# Patient Record
Sex: FEMALE | Race: WHITE | NOT HISPANIC OR LATINO | Employment: UNEMPLOYED | URBAN - METROPOLITAN AREA
[De-identification: names, ages, dates, MRNs, and addresses within clinical notes are randomized per-mention and may not be internally consistent; named-entity substitution may affect disease eponyms.]

---

## 2018-03-06 ENCOUNTER — OFFICE VISIT CONVERTED (OUTPATIENT)
Dept: ORTHOPEDIC SURGERY | Facility: CLINIC | Age: 58
End: 2018-03-06
Attending: ORTHOPAEDIC SURGERY

## 2018-03-27 ENCOUNTER — OFFICE VISIT CONVERTED (OUTPATIENT)
Dept: ORTHOPEDIC SURGERY | Facility: CLINIC | Age: 58
End: 2018-03-27
Attending: PHYSICIAN ASSISTANT

## 2018-05-01 ENCOUNTER — CONVERSION ENCOUNTER (OUTPATIENT)
Dept: ORTHOPEDIC SURGERY | Facility: CLINIC | Age: 58
End: 2018-05-01

## 2018-05-01 ENCOUNTER — OFFICE VISIT CONVERTED (OUTPATIENT)
Dept: ORTHOPEDIC SURGERY | Facility: CLINIC | Age: 58
End: 2018-05-01
Attending: PHYSICIAN ASSISTANT

## 2018-05-08 ENCOUNTER — OFFICE VISIT CONVERTED (OUTPATIENT)
Dept: ORTHOPEDIC SURGERY | Facility: CLINIC | Age: 58
End: 2018-05-08
Attending: PHYSICIAN ASSISTANT

## 2018-05-08 ENCOUNTER — CONVERSION ENCOUNTER (OUTPATIENT)
Dept: ORTHOPEDIC SURGERY | Facility: CLINIC | Age: 58
End: 2018-05-08

## 2018-11-13 ENCOUNTER — OFFICE VISIT CONVERTED (OUTPATIENT)
Dept: ORTHOPEDIC SURGERY | Facility: CLINIC | Age: 58
End: 2018-11-13
Attending: PHYSICIAN ASSISTANT

## 2018-11-20 ENCOUNTER — OFFICE VISIT CONVERTED (OUTPATIENT)
Dept: ORTHOPEDIC SURGERY | Facility: CLINIC | Age: 58
End: 2018-11-20
Attending: PHYSICIAN ASSISTANT

## 2019-09-19 ENCOUNTER — HOSPITAL ENCOUNTER (OUTPATIENT)
Dept: MAMMOGRAPHY | Facility: HOSPITAL | Age: 59
Discharge: HOME | End: 2019-09-19
Attending: NURSE PRACTITIONER

## 2021-05-16 VITALS — HEART RATE: 68 BPM | BODY MASS INDEX: 48.03 KG/M2 | OXYGEN SATURATION: 98 % | WEIGHT: 261 LBS | HEIGHT: 62 IN

## 2021-05-16 VITALS — HEIGHT: 64 IN | OXYGEN SATURATION: 96 % | BODY MASS INDEX: 45.24 KG/M2 | WEIGHT: 265 LBS | HEART RATE: 89 BPM

## 2021-05-16 VITALS — HEART RATE: 57 BPM | BODY MASS INDEX: 48.97 KG/M2 | WEIGHT: 266.12 LBS | OXYGEN SATURATION: 97 % | HEIGHT: 62 IN

## 2021-05-16 VITALS — BODY MASS INDEX: 48.12 KG/M2 | HEART RATE: 51 BPM | HEIGHT: 62 IN | OXYGEN SATURATION: 97 % | WEIGHT: 261.5 LBS

## 2021-05-16 VITALS — OXYGEN SATURATION: 98 % | BODY MASS INDEX: 48.63 KG/M2 | HEIGHT: 62 IN | WEIGHT: 264.25 LBS | HEART RATE: 66 BPM

## 2021-05-16 VITALS — WEIGHT: 263.12 LBS | HEART RATE: 94 BPM | BODY MASS INDEX: 48.42 KG/M2 | HEIGHT: 62 IN | OXYGEN SATURATION: 99 %

## 2021-08-12 ENCOUNTER — TRANSCRIBE ORDERS (OUTPATIENT)
Dept: ADMINISTRATIVE | Facility: HOSPITAL | Age: 61
End: 2021-08-12

## 2021-08-12 DIAGNOSIS — Z12.31 SCREENING MAMMOGRAM FOR HIGH-RISK PATIENT: Primary | ICD-10-CM

## 2022-06-21 ENCOUNTER — APPOINTMENT (OUTPATIENT)
Dept: GENERAL RADIOLOGY | Facility: HOSPITAL | Age: 62
End: 2022-06-21

## 2022-06-21 ENCOUNTER — HOSPITAL ENCOUNTER (EMERGENCY)
Facility: HOSPITAL | Age: 62
Discharge: HOME | End: 2022-06-21
Attending: EMERGENCY MEDICINE | Admitting: EMERGENCY MEDICINE

## 2022-06-21 VITALS
DIASTOLIC BLOOD PRESSURE: 91 MMHG | BODY MASS INDEX: 44.9 KG/M2 | SYSTOLIC BLOOD PRESSURE: 165 MMHG | RESPIRATION RATE: 18 BRPM | OXYGEN SATURATION: 100 % | WEIGHT: 263 LBS | TEMPERATURE: 97.9 F | HEART RATE: 50 BPM | HEIGHT: 64 IN

## 2022-06-21 DIAGNOSIS — M54.50 ACUTE BILATERAL LOW BACK PAIN WITHOUT SCIATICA: ICD-10-CM
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
## 2022-06-21 DIAGNOSIS — M54.2 NECK PAIN: ICD-10-CM

## 2022-06-21 PROCEDURE — 99282 EMERGENCY DEPT VISIT SF MDM: CPT

## 2022-06-21 PROCEDURE — 72050 X-RAY EXAM NECK SPINE 4/5VWS: CPT

## 2022-06-21 PROCEDURE — 96372 THER/PROPH/DIAG INJ SC/IM: CPT

## 2022-06-21 PROCEDURE — 25010000002 KETOROLAC TROMETHAMINE PER 15 MG: Performed by: REGISTERED NURSE

## 2022-06-21 PROCEDURE — 25010000002 ORPHENADRINE CITRATE PER 60 MG: Performed by: REGISTERED NURSE

## 2022-06-21 PROCEDURE — 72100 X-RAY EXAM L-S SPINE 2/3 VWS: CPT

## 2022-06-21 RX ORDER — LOSARTAN POTASSIUM 25 MG/1
TABLET ORAL
COMMUNITY

## 2022-06-21 RX ORDER — CYCLOBENZAPRINE HCL 10 MG
10 TABLET ORAL 3 TIMES DAILY PRN
Qty: 20 TABLET | Refills: 0 | Status: SHIPPED | OUTPATIENT
Start: 2022-06-21

## 2022-06-21 RX ORDER — KETOROLAC TROMETHAMINE 10 MG/1
10 TABLET, FILM COATED ORAL EVERY 6 HOURS PRN
Qty: 20 TABLET | Refills: 0 | Status: SHIPPED | OUTPATIENT
Start: 2022-06-21 | End: 2022-06-26

## 2022-06-21 RX ORDER — METOPROLOL SUCCINATE 50 MG/1
TABLET, EXTENDED RELEASE ORAL
COMMUNITY

## 2022-06-21 RX ORDER — KETOROLAC TROMETHAMINE 30 MG/ML
30 INJECTION, SOLUTION INTRAMUSCULAR; INTRAVENOUS ONCE
Status: COMPLETED | OUTPATIENT
Start: 2022-06-21 | End: 2022-06-21

## 2022-06-21 RX ORDER — ORPHENADRINE CITRATE 30 MG/ML
60 INJECTION INTRAMUSCULAR; INTRAVENOUS ONCE
Status: COMPLETED | OUTPATIENT
Start: 2022-06-21 | End: 2022-06-21

## 2022-06-21 RX ADMIN — KETOROLAC TROMETHAMINE 30 MG: 30 INJECTION, SOLUTION INTRAMUSCULAR; INTRAVENOUS at 18:34

## 2022-06-21 RX ADMIN — ORPHENADRINE CITRATE 60 MG: 60 INJECTION INTRAMUSCULAR; INTRAVENOUS at 18:34
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
## 2022-06-22 NOTE — DISCHARGE INSTRUCTIONS
There were no acute findings on your x-rays today.  Take the prescribed medications as needed for pain.  You can add Tylenol for additional pain relief.  Do not take any additional NSAIDs while taking Toradol.  Apply ice to the areas that hurt several times per day.  Use warm moist compresses after 48 hours.    Return for worsening symptoms or new concerns.

## 2022-08-16 ENCOUNTER — TRANSCRIBE ORDERS (OUTPATIENT)
Dept: ADMINISTRATIVE | Facility: HOSPITAL | Age: 62
End: 2022-08-16

## 2022-08-16 DIAGNOSIS — Z12.31 SCREENING MAMMOGRAM FOR HIGH-RISK PATIENT: Primary | ICD-10-CM

## 2024-12-16 ENCOUNTER — OFFICE VISIT (OUTPATIENT)
Dept: CARDIOLOGY | Facility: CLINIC | Age: 64
End: 2024-12-16
Payer: MEDICAID

## 2024-12-16 VITALS
HEIGHT: 64 IN | DIASTOLIC BLOOD PRESSURE: 95 MMHG | BODY MASS INDEX: 40.87 KG/M2 | OXYGEN SATURATION: 97 % | SYSTOLIC BLOOD PRESSURE: 179 MMHG | HEART RATE: 86 BPM | WEIGHT: 239.4 LBS

## 2024-12-16 DIAGNOSIS — R00.2 PALPITATIONS: ICD-10-CM

## 2024-12-16 DIAGNOSIS — E78.2 MIXED HYPERLIPIDEMIA: ICD-10-CM

## 2024-12-16 DIAGNOSIS — R06.09 DYSPNEA ON EXERTION: ICD-10-CM

## 2024-12-16 DIAGNOSIS — I10 PRIMARY HYPERTENSION: Primary | ICD-10-CM

## 2024-12-16 PROCEDURE — 99204 OFFICE O/P NEW MOD 45 MIN: CPT | Performed by: INTERNAL MEDICINE

## 2024-12-16 RX ORDER — ATORVASTATIN CALCIUM 10 MG/1
1 TABLET, FILM COATED ORAL DAILY
COMMUNITY
Start: 2024-08-20

## 2024-12-16 RX ORDER — ATENOLOL AND CHLORTHALIDONE TABLET 100; 25 MG/1; MG/1
1 TABLET ORAL DAILY
COMMUNITY
Start: 2024-08-20

## 2024-12-16 RX ORDER — HYDROXYZINE HYDROCHLORIDE 25 MG/1
1 TABLET, FILM COATED ORAL EVERY 12 HOURS SCHEDULED
COMMUNITY
Start: 2024-08-20

## 2024-12-16 RX ORDER — DICLOFENAC SODIUM 75 MG/1
TABLET, DELAYED RELEASE ORAL EVERY 12 HOURS SCHEDULED
COMMUNITY

## 2024-12-16 RX ORDER — ALBUTEROL SULFATE 90 UG/1
AEROSOL, METERED RESPIRATORY (INHALATION)
COMMUNITY
Start: 2024-08-20

## 2024-12-16 NOTE — PROGRESS NOTES
"Chief Complaint  Establish Care and Hypertension    Subjective        Maria Antonia Amaya presents to [facility] CARDIOLOGY  History of present illness:    Patient is a 64-year-old female with history of hypertension.  She has been following with a primary care physician.  She is taking losartan 100 daily along with amlodipine 10 daily.  She has not been taking the atenolol/chlorthalidone 100/25 daily.  As she thought it had been discontinued.  She does note a occasional strange sensation in her chest that can occur just sitting still and last for a few seconds.  She does note dyspnea on exertion.  She states she is going from the car to the front door she will be short of breath.  She notes chronic mild edema that is \"kind of normal for her.\"  She does not smoke or drink alcohol.  Her mother and father have no heart disease.      Past Medical History:   Diagnosis Date    Diabetes mellitus     Hypertension          Past Surgical History:   Procedure Laterality Date    ADENOIDECTOMY      FINGER SURGERY      ORIF ANKLE FRACTURE      TONSILLECTOMY            Social History     Socioeconomic History    Marital status: Legally    Tobacco Use    Smoking status: Never     Passive exposure: Past   Vaping Use    Vaping status: Never Used   Substance and Sexual Activity    Alcohol use: Not Currently    Drug use: Not Currently    Sexual activity: Defer         Family History   Problem Relation Age of Onset    Dementia Mother              Kidney cancer Father                    No Known Allergies         Current Outpatient Medications:     AMLODIPINE BESYLATE PO, Take 10 mg by mouth Daily., Disp: , Rfl:     atenolol-chlorthalidone (TENORETIC) 100-25 MG per tablet, Take 1 tablet by mouth Daily., Disp: , Rfl:     atorvastatin (LIPITOR) 10 MG tablet, Take 1 tablet by mouth Daily., Disp: , Rfl:     Citalopram Hydrobromide (CELEXA PO), Take 40 mg by mouth Daily., Disp: , Rfl:     " "cyclobenzaprine (FLEXERIL) 10 MG tablet, Take 1 tablet by mouth 3 (Three) Times a Day As Needed for Muscle Spasms., Disp: 20 tablet, Rfl: 0    diclofenac (VOLTAREN) 75 MG EC tablet, Every 12 (Twelve) Hours., Disp: , Rfl:     hydrOXYzine (ATARAX) 25 MG tablet, Take 1 tablet by mouth Every 12 (Twelve) Hours., Disp: , Rfl:     losartan (COZAAR) 25 MG tablet, Take 4 tablets by mouth Daily., Disp: , Rfl:     Pantoprazole Sodium (PROTONIX PO), Take 40 mg by mouth Daily., Disp: , Rfl:     Semaglutide (RYBELSUS PO), Take  by mouth., Disp: , Rfl:     Ventolin  (90 Base) MCG/ACT inhaler, 2 PUFFS AS NEEDED INHALATION EVERY 4 HRS AS NEEDED 90 DAYS, Disp: , Rfl:     metFORMIN (GLUCOPHAGE) 500 MG tablet, metformin 500 mg oral tablet take 1 tablet (500 mg) by oral route 2 times per day with morning and evening meals   Active (Patient not taking: Reported on 12/16/2024), Disp: , Rfl:     metoprolol succinate XL (TOPROL-XL) 50 MG 24 hr tablet, metoprolol succinate 50 mg oral tablet extended release 24 hr take 1 tablet (50 mg) by oral route once daily   Suspended (Patient not taking: Reported on 12/16/2024), Disp: , Rfl:       ROS:  Cardiac review of systems positive for dyspnea on exertion, chronic mild edema    Objective     /95 (BP Location: Right arm, Patient Position: Sitting, Cuff Size: Large Adult)   Pulse 86   Ht 162.6 cm (64\")   Wt 109 kg (239 lb 6.4 oz)   SpO2 97%   BMI 41.09 kg/m²       General Appearance:   well developed  well nourished  HENT:   oropharynx moist  lips not cyanotic  Respiratory:  no respiratory distress  normal breath sounds  no rales  Cardiovascular:  no jugular venous distention  regular rhythm  S1 normal, S2 normal  no S3, no S4   no murmur  no rub, no thrill  No carotid bruit  pedal pulses normal  lower extremity edema: none    Musculoskeletal:  no clubbing of fingers.   normocephalic, head atraumatic  Skin:   warm, dry  Psychiatric:  judgement and insight appropriate  normal mood " "and affect    ECHO:    STRESS:    CATH:  No results found for this or any previous visit.    BMP:   No results found for: \"GLUCOSE\", \"BUN\", \"CREATININE\", \"NA\", \"K\", \"CL\", \"CO2\", \"CALCIUM\", \"BCR\", \"ANIONGAP\", \"EGFR\"  LIPIDS:  No results found for: \"CHOL\", \"TRIG\", \"HDL\", \"LDL\", \"VLDL\", \"LDLHDL\"      Procedures             ASSESSMENT:  Diagnoses and all orders for this visit:    1. Primary hypertension (Primary)    2. Mixed hyperlipidemia    3. Palpitations    4. Dyspnea on exertion         PLAN:    1.  For patient's dyspnea on exertion I did tell her that there are multiple different possible causes for this.  I did offer her a stress test.  She wants to go back on the atenolol/chlorthalidone and reassess it.  It is possible that with the very elevated blood pressure when she is walking around it is getting even higher and causing her symptoms.  If she continues to note dyspnea on exertion the only option from a heart standpoint would probably be a Lexiscan Cardiolite.  2.  Patient thought that they had stopped the atenolol/chlorthalidone.  I told her she needs to go back on it.  We will reassess her blood pressure at the next appointment.  If needed we would have to consider the addition of spironolactone as the next blood pressure pill.  3.  Patient notes mild chronic edema with no recent change.  We will see if the chlorthalidone helps with that.  4.  Patient has a weird sensation that I am not sure if she is describing palpitations.  We will also see if the addition of the atenolol resolves this.  It does occur just sitting still and last a few seconds.  5.  Patient cholesterol checked 1/9/2024 with LDL 82, HDL 53, and triglycerides 96.  These are under good control.  6.  Patient does not smoke      Return in about 4 weeks (around 1/13/2025).     Patient was given instructions and counseling regarding her condition or for health maintenance advice. Please see specific information pulled into the AVS if " appropriate.         Issac Mariee MD   12/16/2024  15:48 EST

## 2025-01-24 ENCOUNTER — OFFICE VISIT (OUTPATIENT)
Dept: CARDIOLOGY | Facility: CLINIC | Age: 65
End: 2025-01-24
Payer: COMMERCIAL

## 2025-01-24 VITALS
WEIGHT: 239 LBS | HEIGHT: 64 IN | SYSTOLIC BLOOD PRESSURE: 129 MMHG | HEART RATE: 51 BPM | BODY MASS INDEX: 40.8 KG/M2 | DIASTOLIC BLOOD PRESSURE: 71 MMHG

## 2025-01-24 DIAGNOSIS — R06.09 DYSPNEA ON EXERTION: ICD-10-CM

## 2025-01-24 DIAGNOSIS — I10 PRIMARY HYPERTENSION: Primary | ICD-10-CM

## 2025-01-24 DIAGNOSIS — E78.2 MIXED HYPERLIPIDEMIA: ICD-10-CM

## 2025-01-24 PROCEDURE — 99214 OFFICE O/P EST MOD 30 MIN: CPT | Performed by: INTERNAL MEDICINE

## 2025-01-24 RX ORDER — ONDANSETRON 4 MG/1
TABLET, ORALLY DISINTEGRATING ORAL
COMMUNITY
Start: 2025-01-21

## 2025-01-24 NOTE — PROGRESS NOTES
Chief Complaint  Follow-up and Hypertension    Subjective        Maria Antonia Amaya presents to [facility] CARDIOLOGY  History of present illness:    Patient states she has started taking the atenolol/chlorthalidone.  She states her blood pressure has been much better.  She still notes some dyspnea on exertion.  She is limited by knee pain.  She denies any exertional chest pain.  She notes 2 episodes of lightheadedness.  Once she had just gotten up from sleep.  The other 1 she was in the kitchen cooking and turned and felt a little bit off balance but no obvious presyncope      Past Medical History:   Diagnosis Date    Diabetes mellitus     Hypertension          Past Surgical History:   Procedure Laterality Date    ADENOIDECTOMY      FINGER SURGERY      ORIF ANKLE FRACTURE      TONSILLECTOMY            Social History     Socioeconomic History    Marital status: Legally    Tobacco Use    Smoking status: Never     Passive exposure: Past   Vaping Use    Vaping status: Never Used   Substance and Sexual Activity    Alcohol use: Not Currently    Drug use: Not Currently    Sexual activity: Defer         Family History   Problem Relation Age of Onset    Dementia Mother              Kidney cancer Father                    No Known Allergies         Current Outpatient Medications:     AMLODIPINE BESYLATE PO, Take 10 mg by mouth Daily., Disp: , Rfl:     atenolol-chlorthalidone (TENORETIC) 100-25 MG per tablet, Take 1 tablet by mouth Daily., Disp: , Rfl:     atorvastatin (LIPITOR) 10 MG tablet, Take 1 tablet by mouth Daily., Disp: , Rfl:     Citalopram Hydrobromide (CELEXA PO), Take 40 mg by mouth Daily., Disp: , Rfl:     cyclobenzaprine (FLEXERIL) 10 MG tablet, Take 1 tablet by mouth 3 (Three) Times a Day As Needed for Muscle Spasms., Disp: 20 tablet, Rfl: 0    diclofenac (VOLTAREN) 75 MG EC tablet, Every 12 (Twelve) Hours., Disp: , Rfl:     hydrOXYzine (ATARAX) 25 MG tablet, Take  "1 tablet by mouth Every 12 (Twelve) Hours., Disp: , Rfl:     losartan (COZAAR) 25 MG tablet, Take 4 tablets by mouth Daily., Disp: , Rfl:     ondansetron ODT (ZOFRAN-ODT) 4 MG disintegrating tablet, , Disp: , Rfl:     Pantoprazole Sodium (PROTONIX PO), Take 40 mg by mouth Daily., Disp: , Rfl:     Semaglutide (RYBELSUS PO), Take  by mouth., Disp: , Rfl:     Ventolin  (90 Base) MCG/ACT inhaler, 2 PUFFS AS NEEDED INHALATION EVERY 4 HRS AS NEEDED 90 DAYS, Disp: , Rfl:     metFORMIN (GLUCOPHAGE) 500 MG tablet, metformin 500 mg oral tablet take 1 tablet (500 mg) by oral route 2 times per day with morning and evening meals   Active (Patient not taking: Reported on 1/24/2025), Disp: , Rfl:       ROS:  Cardiac review of systems positive for dyspnea on exertion and 2 episodes of dizziness    Objective     /71   Pulse 51   Ht 162.6 cm (64\")   Wt 108 kg (239 lb)   BMI 41.02 kg/m²       General Appearance:   well developed  well nourished  HENT:   oropharynx moist  lips not cyanotic  Respiratory:  no respiratory distress  normal breath sounds  no rales  Cardiovascular:  no jugular venous distention  regular rhythm  S1 normal, S2 normal  no S3, no S4   no murmur  no rub, no thrill  No carotid bruit  pedal pulses normal  lower extremity edema: none    Musculoskeletal:  no clubbing of fingers.   normocephalic, head atraumatic  Skin:   warm, dry  Psychiatric:  judgement and insight appropriate  normal mood and affect    ECHO:    STRESS:    CATH:  No results found for this or any previous visit.    BMP:   No results found for: \"GLUCOSE\", \"BUN\", \"CREATININE\", \"NA\", \"K\", \"CL\", \"CO2\", \"CALCIUM\", \"BCR\", \"ANIONGAP\", \"EGFR\"  LIPIDS:  No results found for: \"CHOL\", \"TRIG\", \"HDL\", \"LDL\", \"VLDL\", \"LDLHDL\"      Procedures             ASSESSMENT:  Diagnoses and all orders for this visit:    1. Primary hypertension (Primary)    2. Dyspnea on exertion    3. Mixed hyperlipidemia         PLAN:    1.  Normal heart rate is  bpm. "  She is hanging out in the low 50s which I think is perfectly fine and I do not think is causing symptoms.  Her heart rate is lower due to her starting back on the atenolol/chlorthalidone.  2.  Patient is had no further lightheadedness.  If she continues to have this she will give us a call.  At that time we could either back down on her amlodipine to 5 mg a day or possibly switch her atenolol/chlorthalidone to 50/25 daily.  I have asked her to make sure she is drinking plenty of water and limiting her caffeine.  3.  Patient's blood pressures under much better control.  4.  Patient does note continued dyspnea on exertion.  I did tell her this could be due to her being limited from the knee pain and also her weight.  I did again offer her a Lexiscan Cardiolite.  We have decided just to continue to watch it and if it gets worse she will let us know.  5.  Continue the Lipitor.  Patient cholesterol checked 1/9/2024 with LDL 82, HDL 53, and triglycerides 96.  These are under good control.  6.  Will bring back in 2 months.  Again she will call us if any more episodes of dizziness or worsening shortness of breath.  If she is stable at that time we could either see her as needed or once a year.      Return in about 2 months (around 3/24/2025) for syed huber.     Patient was given instructions and counseling regarding her condition or for health maintenance advice. Please see specific information pulled into the AVS if appropriate.         Issac Mariee MD   1/24/2025  13:54 EST

## 2025-03-24 ENCOUNTER — TRANSCRIBE ORDERS (OUTPATIENT)
Dept: ADMINISTRATIVE | Facility: HOSPITAL | Age: 65
End: 2025-03-24
Payer: COMMERCIAL

## 2025-03-24 DIAGNOSIS — Z12.31 ENCOUNTER FOR SCREENING MAMMOGRAM FOR MALIGNANT NEOPLASM OF BREAST: Primary | ICD-10-CM
